# Patient Record
Sex: FEMALE | Race: WHITE | Employment: STUDENT | URBAN - METROPOLITAN AREA
[De-identification: names, ages, dates, MRNs, and addresses within clinical notes are randomized per-mention and may not be internally consistent; named-entity substitution may affect disease eponyms.]

---

## 2021-09-03 ENCOUNTER — OFFICE VISIT (OUTPATIENT)
Dept: NEUROLOGY | Age: 26
End: 2021-09-03
Payer: COMMERCIAL

## 2021-09-03 ENCOUNTER — DOCUMENTATION ONLY (OUTPATIENT)
Dept: NEUROLOGY | Age: 26
End: 2021-09-03

## 2021-09-03 VITALS
BODY MASS INDEX: 29.12 KG/M2 | SYSTOLIC BLOOD PRESSURE: 102 MMHG | WEIGHT: 181.2 LBS | RESPIRATION RATE: 16 BRPM | OXYGEN SATURATION: 98 % | HEIGHT: 66 IN | HEART RATE: 66 BPM | DIASTOLIC BLOOD PRESSURE: 80 MMHG

## 2021-09-03 DIAGNOSIS — F90.9 ATTENTION DEFICIT HYPERACTIVITY DISORDER (ADHD), UNSPECIFIED ADHD TYPE: ICD-10-CM

## 2021-09-03 DIAGNOSIS — G43.709 CHRONIC MIGRAINE W/O AURA, NOT INTRACTABLE, W/O STAT MIGR: Primary | ICD-10-CM

## 2021-09-03 PROCEDURE — 99204 OFFICE O/P NEW MOD 45 MIN: CPT | Performed by: PSYCHIATRY & NEUROLOGY

## 2021-09-03 RX ORDER — PROPRANOLOL HYDROCHLORIDE 120 MG/1
120 CAPSULE, EXTENDED RELEASE ORAL DAILY
COMMUNITY
End: 2021-09-03 | Stop reason: ALTCHOICE

## 2021-09-03 RX ORDER — TOPIRAMATE 50 MG/1
TABLET, FILM COATED ORAL 2 TIMES DAILY
COMMUNITY
End: 2021-10-15 | Stop reason: SDUPTHER

## 2021-09-03 RX ORDER — PROPRANOLOL HYDROCHLORIDE 60 MG/1
60 CAPSULE, EXTENDED RELEASE ORAL DAILY
Qty: 30 CAPSULE | Refills: 1 | Status: SHIPPED | OUTPATIENT
Start: 2021-09-03 | End: 2021-10-15 | Stop reason: SDUPTHER

## 2021-09-03 NOTE — PATIENT INSTRUCTIONS
[facility]   Statement to Patients  April 1, 2014      In an effort to ensure the large volume of patient prescription refills is processed in the most efficient and expeditious manner, we are asking our patients to assist us by calling your Pharmacy for all prescription refills, this will include also your  Mail Order Pharmacy. The pharmacy will contact our office electronically to continue the refill process. Please do not wait until the last minute to call your pharmacy. We need at least 48 hours (2days) to fill prescriptions. We also encourage you to call your pharmacy before going to  your prescription to make sure it is ready. With regard to controlled substance prescription refill requests (narcotic refills) that need to be picked up at our office, we ask your cooperation by providing us with at least 72 hours (3days) notice that you will need a refill. We will not refill narcotic prescription refill requests after 4:00pm on any weekday, Monday through Thursday, or after 2:00pm on Fridays, or on the weekends. We encourage everyone to explore another way of getting your prescription refill request processed using [employer], our patient web portal through our electronic medical record system. [employer] is an efficient and effective way to communicate your medication request directly to the office and  downloadable as an daljit on your smart phone . [employer] also features a review functionality that allows you to view your medication list as well as leave messages for your physician. Are you ready to get connected? If so please review the attatched instructions or speak to any of our staff to get you set up right away! Thank you so much for your cooperation. Should you have any questions please contact our Practice Administrator.     The Physicians and Staff,  [facility]

## 2021-09-03 NOTE — PROGRESS NOTES
Ms. Gregoria Granger presents as a new patient for evaluation of migraines. She reported getting a migraine approximately every two weeks. Depression screening done on this patient.

## 2021-09-03 NOTE — PROGRESS NOTES
Chief Complaint   Patient presents with    Neurologic Problem         HISTORY OF PRESENT ILLNESS  Armand Richardson is a 31 y.o. female came in to establish care regarding chronic migraines. She recently relocated from [facility] and used to follow-up with a neurologist there. She has had migraines for as long as she can remember. She describes her headaches typically as sharp throbbing pain, usually on the left side, associated with photophobia, phonophobia and occasionally nausea. Activity makes it worse. No other complicated features or neurological symptoms. Uses an NSAID as needed or takes nap/warm shower which helps. Has never been on any triptans for abortive. Was initially put on propanolol for prophylaxis which did not make much difference. Then topiramate was added about a year ago and it helped a lot. She now rarely gets a migraine and is taking 100 mg twice daily. Does experience paresthesias in her hands, a taste of carbonated beverage in her mouth and has lost weight but has acclimatized well to this medication. She was diagnosed with attention deficit hyperactivity disorder when she was in elementary school. She took medication for some time and then did fine without it. Recently she has been noticing that she cannot finish the tasks or jobs that she starts and struggles with things, and jumps from one thing to another. This is affecting her everyday performance at home and work. She is wondering if she needs some medication to help her focus better. 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     propranolol LA (INDERAL LA) 60 mg SR capsule Take 1 Capsule by mouth daily.  meclizine 25 mg Cap Take 25 mg by mouth daily. (Patient not taking: Reported on 9/3/2021)     No current facility-administered medications for this visit. No Known Allergies  History reviewed. No pertinent family history. Social History     Tobacco Use    Smoking status: Never Smoker    Smokeless tobacco: Never Used   Substance Use Topics    Alcohol use: No    Drug use: No     Past Surgical History:   Procedure Laterality Date    HX OTHER SURGICAL      removal of oral cyst         REVIEW OF SYSTEMS  Review of Systems - History obtained from the patient  Psychological ROS: negative  ENT ROS: negative  Hematological and Lymphatic ROS: negative  Endocrine ROS: negative  Respiratory ROS: no cough, shortness of breath, or wheezing  Cardiovascular ROS: no chest pain or dyspnea on exertion  Gastrointestinal ROS: no abdominal pain, change in bowel habits, or black or bloody stools  Genito-Urinary ROS: no dysuria, trouble voiding, or hematuria  Musculoskeletal ROS: negative  Dermatological ROS: negative      PHYSICAL EXAMINATION:    Visit Vitals  /80   Pulse 66   Resp 16   Ht 5' 6\" (1.676 m)   Wt 181 lb 3.2 oz (82.2 kg)   SpO2 98%   BMI 29.25 kg/m²     General:  Well groomed individual in no acute distress. Neck: Supple, nontender, no bruits, no pain with resistance to active range of motion. Heart: Regular rate and rhythm. Normal S1S2. Lungs:  Equal chest expansion, no cough, no wheeze  Musculoskeletal:  Extremities revealed no edema and had full range of motion of joints. Psych:  Good mood and bright affect    NEUROLOGICAL EXAMINATION:     Mental Status:   Alert and oriented to person, place, and time with recent and remote memory intact. Attention span and concentration are normal. Speech is fluent. Cranial Nerves:    II, III, IV, VI:  Visual acuity grossly intact. Visual fields are normal.    Pupils are equal, round, and reactive to light.     Extra-ocular movements are full and fluid. Fundoscopic exam was benign, no ptosis or nystagmus. V-XII: Hearing is grossly intact. Facial features are symmetric, with normal sensation and strength. The palate rises symmetrically and the tongue protrudes midline. Sternocleidomastoids 5/5. Motor Examination: Normal tone, bulk, and strength. 5/5 muscle strength throughout. No cogwheel rigidity or clonus present. Sensory exam:  Normal throughout to pinprick, temperature, and vibration sense. Normal proprioception. Coordination:  Finger to nose and rapid arm movement testing was normal.   No resting or intention tremor    Gait and Station:  Steady while walking on toes, heels, and with tandem walking. Normal arm swing. No Rhomberg or pronator drift. No muscle wasting or fasiculations noted. Reflexes:  DTRs 2+ throughout. Toes downgoing. ASSESSMENT    ICD-10-CM ICD-9-CM    1. Chronic migraine w/o aura, not intractable, w/o stat migr  G43.709 346.70 propranolol LA (INDERAL LA) 60 mg SR capsule   2. Attention deficit hyperactivity disorder (ADHD), unspecified ADHD type  F90.9 314.01 REFERRAL TO NEUROPSYCHOLOGY       DISCUSSION  Ms. Kathia Rios has chronic migraines without aura. Migraine treatment strategies were again reviewed including potential dietary and environmental triggers  Continue to use NSAIDs as needed for abortive therapy. She now rarely gets a migraine. Currently on 2 prophylactics including propanolol and topiramate. She reports that propanolol did not make much difference when she started it but topiramate helped her dramatically. We will try to lower the dose of propanolol to 60 mg extended release daily. If the migraine frequency does not change, will try to wean it off. Continue topiramate. She is tolerating it well with side effect profile including pregnancy category D.     She reports a history of attention deficit hyperactivity disorder and she is again having difficulties concentrating and focusing, staying on task and completing those on time.   This is impacting her life at work and school   I have recommended comprehensive neuropsychological assessment for further diagnostic clarification prior to considering any stimulant medications       Marry Ferrari MD  Diplomate, American Board of Psychiatry & Neurology (Neurology)  Diplomate, American Board of Psychiatry & Neurology (Clinical Neurophysiology)  Diplomate, American Board of Electrodiagnostic Medicine

## 2021-10-15 DIAGNOSIS — G43.709 CHRONIC MIGRAINE W/O AURA, NOT INTRACTABLE, W/O STAT MIGR: ICD-10-CM

## 2021-10-15 RX ORDER — TOPIRAMATE 50 MG/1
50 TABLET, FILM COATED ORAL 2 TIMES DAILY
Qty: 60 TABLET | Refills: 3 | Status: SHIPPED | OUTPATIENT
Start: 2021-10-15 | End: 2022-02-07

## 2021-10-15 RX ORDER — PROPRANOLOL HYDROCHLORIDE 60 MG/1
60 CAPSULE, EXTENDED RELEASE ORAL DAILY
Qty: 30 CAPSULE | Refills: 1 | Status: SHIPPED | OUTPATIENT
Start: 2021-10-15 | End: 2021-11-29

## 2021-10-15 NOTE — TELEPHONE ENCOUNTER
----- Message from April Claritza sent at 10/15/2021  3:32 PM EDT -----  Regarding: /Telephone  General Message/Vendor Calls      Caller's first and last name: Chelsea Keller       Reason for call:Pt Needs a refill request for \"  propenoal \"  and \"  tropamight \"        Callback required yes/no and why: yes      Best contact number(s): [phone]      Details to clarify the request:      April Claritza

## 2022-02-07 RX ORDER — TOPIRAMATE 50 MG/1
TABLET, FILM COATED ORAL
Qty: 60 TABLET | Refills: 2 | Status: SHIPPED | OUTPATIENT
Start: 2022-02-07 | End: 2022-05-31

## 2022-03-21 DIAGNOSIS — G43.709 CHRONIC MIGRAINE W/O AURA, NOT INTRACTABLE, W/O STAT MIGR: ICD-10-CM

## 2022-03-21 RX ORDER — PROPRANOLOL HYDROCHLORIDE 60 MG/1
60 CAPSULE, EXTENDED RELEASE ORAL DAILY
Qty: 90 CAPSULE | Refills: 2 | Status: SHIPPED | OUTPATIENT
Start: 2022-03-21 | End: 2022-07-11

## 2022-05-31 RX ORDER — TOPIRAMATE 50 MG/1
TABLET, FILM COATED ORAL
Qty: 60 TABLET | Refills: 2 | Status: SHIPPED | OUTPATIENT
Start: 2022-05-31 | End: 2022-07-15

## 2022-07-11 ENCOUNTER — NURSE TRIAGE (OUTPATIENT)
Dept: OTHER | Facility: CLINIC | Age: 27
End: 2022-07-11

## 2022-07-11 ENCOUNTER — OFFICE VISIT (OUTPATIENT)
Dept: FAMILY MEDICINE CLINIC | Age: 27
End: 2022-07-11
Payer: COMMERCIAL

## 2022-07-11 VITALS
SYSTOLIC BLOOD PRESSURE: 115 MMHG | DIASTOLIC BLOOD PRESSURE: 76 MMHG | HEART RATE: 72 BPM | WEIGHT: 203 LBS | OXYGEN SATURATION: 97 % | BODY MASS INDEX: 32.77 KG/M2

## 2022-07-11 DIAGNOSIS — R42 DIZZINESS: Primary | ICD-10-CM

## 2022-07-11 PROBLEM — N63.20 LEFT BREAST MASS: Status: ACTIVE | Noted: 2017-11-20

## 2022-07-11 PROCEDURE — 99204 OFFICE O/P NEW MOD 45 MIN: CPT | Performed by: STUDENT IN AN ORGANIZED HEALTH CARE EDUCATION/TRAINING PROGRAM

## 2022-07-11 RX ORDER — IBUPROFEN 800 MG/1
800 TABLET ORAL
COMMUNITY

## 2022-07-11 RX ORDER — MEDROXYPROGESTERONE ACETATE 150 MG/ML
150 INJECTION, SUSPENSION INTRAMUSCULAR
COMMUNITY

## 2022-07-11 RX ORDER — ESCITALOPRAM OXALATE 10 MG/1
10 TABLET ORAL DAILY
COMMUNITY
Start: 2021-10-29

## 2022-07-11 RX ORDER — PROPRANOLOL HYDROCHLORIDE 120 MG/1
120 CAPSULE, EXTENDED RELEASE ORAL DAILY
COMMUNITY

## 2022-07-11 NOTE — TELEPHONE ENCOUNTER
Received call from Danelle Heard at [facility] with [employer] Complaint. Subjective: Caller states \"Seen in [facility] over the weekend. Did find some abnormal findings on her EKG. \"     BP on the weekend 140/90 and 143/94 and 156/95 and 179/113    Current Symptoms: heart racing (Thursday, apple watch tracks heart rate and when standing it goes over 100. With any movement it was over 120. When lifting boxes it went over 180). Onset: 7 days    Associated Symptoms: dizziness    Pain Severity: denies pain    Temperature: denies fever    What has been tried: NA    LMP: NA Pregnant: NA    Recommended disposition: See in Office Today    Care advice provided, patient verbalizes understanding; denies any other questions or concerns; instructed to call back for any new or worsening symptoms. Patient agrees to be seen by PCP today. As a new patient if nothing is available, she is advised to report to [facility] for symptoms. Message sent to Anastasiya for scheduling of appointment. Attention Provider: Thank you for allowing me to participate in the care of your patient. The patient was connected to triage in response to information provided to the [facility]. Please do not respond through this encounter as the response is not directed to a shared pool.     Reason for Disposition   Heart beating very rapidly (e.g., > 140 / minute) and not present now (Exception: during exercise)    Protocols used: HEART RATE AND HEARTBEAT QUESTIONS-ADULT-[place]

## 2022-07-11 NOTE — PROGRESS NOTES
Subjective   Katia Medina is a 31 y.o. female who presents for Follow-up (shortness of breath,dizziness)    Dizziness  About 1 week ago, started feeling dizzy. Lasted for about 3 days, worse in evening. Went away with laying down. Also fatigued. BP at home was 179/113 at highest. Denise Lights to urgent care, who did labs and an EKG. HR to 185 on Apple Watch, usually with walking. Not room spinning dizziness. SOB when HR high. She is not currently sexually active. MI in Dad 2 years ago. No Fam Hx of CVA. Review of Systems   Review of Systems   Constitutional: Negative for chills and fever. HENT: Negative for congestion and sore throat. Eyes: Negative for discharge and redness. Respiratory: Negative for cough and shortness of breath. Cardiovascular: Negative for chest pain and palpitations. Gastrointestinal: Negative for nausea and vomiting. Genitourinary: Negative for difficulty urinating and dysuria. Musculoskeletal: Negative for gait problem and neck stiffness. Neurological: Positive for dizziness and light-headedness. Negative for tremors, seizures, syncope, facial asymmetry, speech difficulty, weakness, numbness and headaches. Medical History  Past Medical History:   Diagnosis Date    Bell's palsy     Migraine     Neurological disorder        Medications  Current Outpatient Medications   Medication Sig    escitalopram oxalate (LEXAPRO) 10 mg tablet Take 10 mg by mouth daily.  topiramate (TOPAMAX) 50 mg tablet Take 1 tablet by mouth twice daily.  propranolol LA (INDERAL LA) 120 mg SR capsule Take 120 mg by mouth daily.  medroxyPROGESTERone (DEPO-PROVERA) 150 mg/mL syrg 150 mg by IntraMUSCular route.  ibuprofen (MOTRIN) 800 mg tablet Take 800 mg by mouth every six (6) hours as needed. No current facility-administered medications for this visit. Immunizations     There is no immunization history on file for this patient.     Allergies   No Known Allergies    Objective   Vital Signs  Visit Vitals  /76 (BP 1 Location: Left arm, BP Patient Position: Sitting, BP Cuff Size: Adult long)   Pulse 72   Wt 203 lb (92.1 kg)   SpO2 97%   BMI 32.77 kg/m²       Physical Examination  Physical Exam  Constitutional:       General: She is not in acute distress. Appearance: Normal appearance. HENT:      Head: Normocephalic and atraumatic. Right Ear: External ear normal.      Left Ear: External ear normal.      Mouth/Throat:      Mouth: Mucous membranes are moist.      Pharynx: Oropharynx is clear. No oropharyngeal exudate or posterior oropharyngeal erythema. Eyes:      Extraocular Movements: Extraocular movements intact. Conjunctiva/sclera: Conjunctivae normal.      Pupils: Pupils are equal, round, and reactive to light. Cardiovascular:      Rate and Rhythm: Normal rate and regular rhythm. Pulses: Normal pulses. Heart sounds: Normal heart sounds. No murmur heard. Pulmonary:      Effort: Pulmonary effort is normal. No respiratory distress. Breath sounds: Normal breath sounds. Abdominal:      General: There is no distension. Palpations: Abdomen is soft. Tenderness: There is no abdominal tenderness. Musculoskeletal:      Right lower leg: No edema. Left lower leg: No edema. Skin:     Coloration: Skin is not jaundiced or pale. Findings: No bruising or rash. Neurological:      General: No focal deficit present. Mental Status: She is alert and oriented to person, place, and time. Cranial Nerves: No cranial nerve deficit. Sensory: No sensory deficit. Motor: No weakness. Coordination: Coordination normal.      Gait: Gait normal.   Psychiatric:         Mood and Affect: Mood normal.         Behavior: Behavior normal.          Assessment and Plan   Asim Mcmahon is a 31 y.o. female who presents for Follow-up (shortness of breath,dizziness)    1.  Dizziness  - Reviewed lab work from urgent care. UA, CBC, BMP wnl.  - Differential for dizziness is broad at this stage. Orthostatic vitals normal today. Propranolol is an unlikely culprit given duration she has been taking this med at this dose. With normal EKG at urgent care, no chest pain, cardiac concern is lower. Normal neuro exam, so posterior stroke is unlikely. She should keep a symptom diary and follow up with her neurologist for further eval, which may include adjustment of her propranolol for migraine ppx. She should return for annual physical. Gets paps with her OBGYN. ICD-10-CM ICD-9-CM    1. Dizziness  R42 780.4 CANCELED: AMB POC EKG ROUTINE W/ 12 LEADS, INTER & REP       Follow-up and Dispositions    · Return in about 6 weeks (around 8/22/2022) for Annual Physical.       Pt was discussed with Dr. Sumeet Lu (attending physician). I have reviewed patient medical and social history and medications. I have reviewed pertinent labs results and other data. I have discussed the diagnosis with the patient and the intended plan as seen in the above orders. The patient has received an after-visit summary and questions were answered concerning future plans. I have discussed medication side effects and warnings with the patient as well.     Kinjal Miles MD  Resident, [address] [place] [address]  07/11/22

## 2022-07-11 NOTE — PATIENT INSTRUCTIONS
Dizziness: Care Instructions  Your Care Instructions  Dizziness is the feeling of unsteadiness or fuzziness in your head. It is different than having vertigo, which is a feeling that the room is spinning or that you are moving or falling. It is also different from lightheadedness, which is the feeling that you are about to faint. It can be hard to know what causes dizziness. Some people feel dizzy when they have migraine headaches. Sometimes bouts of flu can make you feel dizzy. Some medical conditions, such as heart problems or high blood pressure, can make you feel dizzy. Many medicines can cause dizziness, including medicines for high blood pressure, pain, or anxiety. If a medicine causes your symptoms, your doctor may recommend that you stop or change the medicine. If it is a problem with your heart, you may need medicine to help your heart work better. If there is no clear reason for your symptoms, your doctor may suggest watching and waiting for a while to see if the dizziness goes away on its own. Follow-up care is a key part of your treatment and safety. Be sure to make and go to all appointments, and call your doctor if you are having problems. It's also a good idea to know your test results and keep a list of the medicines you take. How can you care for yourself at home? · If your doctor recommends or prescribes medicine, take it exactly as directed. Call your doctor if you think you are having a problem with your medicine. · Do not drive while you feel dizzy. · Try to prevent falls. Steps you can take include:  ? Using nonskid mats, adding grab bars near the tub, and using night-lights. ? Clearing your home so that walkways are free of anything you might trip on.  ? Letting family and friends know that you have been feeling dizzy. This will help them know how to help you. When should you call for help? Call 911 anytime you think you may need emergency care.  For example, call if:    · You passed out (lost consciousness).     · You have dizziness along with symptoms of a heart attack. These may include:  ? Chest pain or pressure, or a strange feeling in the chest.  ? Sweating. ? Shortness of breath. ? Nausea or vomiting. ? Pain, pressure, or a strange feeling in the back, neck, jaw, or upper belly or in one or both shoulders or arms. ? Lightheadedness or sudden weakness. ? A fast or irregular heartbeat.     · You have symptoms of a stroke. These may include:  ? Sudden numbness, tingling, weakness, or loss of movement in your face, arm, or leg, especially on only one side of your body. ? Sudden vision changes. ? Sudden trouble speaking. ? Sudden confusion or trouble understanding simple statements. ? Sudden problems with walking or balance. ? A sudden, severe headache that is different from past headaches. Call your doctor now or seek immediate medical care if:    · You feel dizzy and have a fever, headache, or ringing in your ears.     · You have new or increased nausea and vomiting.     · Your dizziness does not go away or comes back. Watch closely for changes in your health, and be sure to contact your doctor if:    · You do not get better as expected. Where can you learn more? Go to http://www.gray.com/  Enter Q823 in the search box to learn more about \"Dizziness: Care Instructions. \"  Current as of: July 1, 2021               Content Version: 13.2  © 9200-5302 [employer]. Care instructions adapted under license by [employer] (which disclaims liability or warranty for this information). If you have questions about a medical condition or this instruction, always ask your healthcare professional. [place] [address] any warranty or liability for your use of this information.

## 2022-07-15 ENCOUNTER — OFFICE VISIT (OUTPATIENT)
Dept: NEUROLOGY | Age: 27
End: 2022-07-15
Payer: COMMERCIAL

## 2022-07-15 VITALS
OXYGEN SATURATION: 97 % | DIASTOLIC BLOOD PRESSURE: 62 MMHG | BODY MASS INDEX: 32.47 KG/M2 | WEIGHT: 202 LBS | HEART RATE: 92 BPM | SYSTOLIC BLOOD PRESSURE: 100 MMHG | HEIGHT: 66 IN | RESPIRATION RATE: 16 BRPM

## 2022-07-15 DIAGNOSIS — F90.9 ATTENTION DEFICIT HYPERACTIVITY DISORDER (ADHD), UNSPECIFIED ADHD TYPE: ICD-10-CM

## 2022-07-15 DIAGNOSIS — G43.709 CHRONIC MIGRAINE W/O AURA, NOT INTRACTABLE, W/O STAT MIGR: Primary | ICD-10-CM

## 2022-07-15 PROCEDURE — 99214 OFFICE O/P EST MOD 30 MIN: CPT | Performed by: PSYCHIATRY & NEUROLOGY

## 2022-07-15 RX ORDER — TOPIRAMATE 50 MG/1
50 CAPSULE, EXTENDED RELEASE ORAL DAILY
Qty: 30 CAPSULE | Refills: 1 | Status: SHIPPED | OUTPATIENT
Start: 2022-07-15 | End: 2022-08-22

## 2022-07-15 NOTE — PROGRESS NOTES
Chief Complaint   Patient presents with    Follow-up     Dizziness: Patient reports getting better. HISTORY OF PRESENT ILLNESS  Carlotta Jiménez came in for follow-up. She was last seen almost 10 months ago. Overall, she is doing quite well and now may get a migraine 1 to 2 days out of the month. Uses ibuprofen which usually works for  [employer] to take propanolol extended release 60 mg daily along with topiramate 50 mg twice daily. She does report feeling very fatigued during the day and feels that she has to take a nap. Occasionally she will feel some tingling sensation in her fingertips and mouth. She has noticed that if she misses the dose of propanolol she gets a migraine but not so much association with missed dose of topiramate. She had lost some weight but recently put it back on. She is planning to go to grad school in the near future and then eventually wants to adopt kids. About a week ago she had an episode of dizziness and it would exacerbate if she would get up too quickly or move. Also felt some palpitations with it. Symptoms subsided/resolved on their own after about 3 days. Cardiac work-up/EKG was negative. Did not have any focal motor or sensory deficits, changes in vision, speech or coordination. RECAP  She recently relocated from [facility] and used to follow-up with a neurologist there. She has had migraines for as long as she can remember. She describes her headaches typically as sharp throbbing pain, usually on the left side, associated with photophobia, phonophobia and occasionally nausea. Activity makes it worse. No other complicated features or neurological symptoms. Uses an NSAID as needed or takes nap/warm shower which helps. Has never been on any triptans for abortive. Was initially put on propanolol for prophylaxis which did not make much difference. Then topiramate was added about a year ago and it helped a lot.   Does experience paresthesias in her hands, a taste of carbonated beverage in her mouth and has lost weight but has acclimatized well to this medication. She was diagnosed with attention deficit hyperactivity disorder when she was in elementary school. She took medication for some time and then did fine without it. Recently she has been noticing that she cannot finish the tasks or jobs that she starts and struggles with things, and jumps from one thing to another. This is affecting her everyday performance at home and work. She is wondering if she needs some medication to help her focus better. 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  escitalopram oxalate (LEXAPRO) 10 mg tablet Take 10 mg by mouth daily.  propranolol LA (INDERAL LA) 120 mg SR capsule Take 120 mg by mouth daily.  medroxyPROGESTERone (DEPO-PROVERA) 150 mg/mL syrg 150 mg by IntraMUSCular route.  ibuprofen (MOTRIN) 800 mg tablet Take 800 mg by mouth every six (6) hours as needed. No current facility-administered medications for this visit. PHYSICAL EXAMINATION:    Visit Vitals  /62 (BP 1 Location: Left upper arm, BP Patient Position: Sitting)   Pulse 92   Resp 16   Ht 5' 6\" (1.676 m)   Wt 202 lb (91.6 kg)   SpO2 97%   BMI 32.60 kg/m²       NEUROLOGICAL EXAMINATION:     Mental Status:   Alert and oriented to person, place, and time. Attention span and concentration are normal. Speech is fluent . Cranial Nerves:    II, III, IV, VI:  Visual acuity grossly intact. Visual fields are normal.    Pupils are equal, round, and reactive. Extra-ocular movements are full and fluid. V-XII: Hearing is grossly intact. Facial features are symmetric, with normal sensation and strength.   The palate rises symmetrically and the tongue protrudes midline. Motor Examination: Normal tone, bulk, and strength. Sensory exam:  Normal throughout     Coordination:  Finger to nose and rapid arm movement testing was normal.   No resting or intention tremor    Gait and Station:  Steady. Normal arm swing. No muscle wasting or fasiculations noted. ASSESSMENT    ICD-10-CM ICD-9-CM    1. Chronic migraine w/o aura, not intractable, w/o stat migr  G43.709 346.70 Trokendi XR 50 mg capsule   2. Attention deficit hyperactivity disorder (ADHD), unspecified ADHD type  F90.9 314.01 Trokendi XR 50 mg capsule       DISCUSSION  Ms. Jacquelyn Saldana has chronic migraines without aura. She is doing well with the current prophylactics and migraines are now rare, maybe once or 2 times per month. Uses NSAID's as an abortive and is currently on propanolol extended release 60 mg daily along with topiramate 50 mg twice daily  She reports that if she misses a dose of propanolol, migraine tends to come back but missing a dose of topiramate does not impact her much  She does report paresthesias around her mouth and fingertips and altered taste off and on which are likely due to topiramate. Recently had an episode where she felt dizzy, had some palpitations with it. She discussed this with Dr. Pablo Yoo and had basic cardiac work-up which was negative  Did not have any focal neurological symptoms and her exam is unremarkable  Etiology of this is unclear, peripheral/vestibular dysfunction remains a possibility  I will try to reduce her topiramate and change it to extended release Trokendi 50 mg at bedtime to minimize side effects  If symptoms return, we will need to consider additional work-up but my suspicion for posterior circulation ischemia in this young individual is low. May need extended cardiac monitoring.     She also has ADHD by history and we may need objective evidence with the help of a comprehensive neuropsychological assessment in case we plan on starting a stimulant      Katherin Bailey MD  Diplomate, American Board of Psychiatry & Neurology (Neurology)  Vashti Morris Board of Psychiatry & Neurology (Clinical Neurophysiology)  Diplomate, American Board of Electrodiagnostic Medicine

## 2022-07-15 NOTE — PROGRESS NOTES
TROKENDI XR 50MG given to patient-7 pills in total.    Patient instructions given to patient, patient understood.      LOT: [id number]  EXP: 1/31/2023  MANU: Paulie Hickman

## 2022-07-15 NOTE — PROGRESS NOTES
Chief Complaint   Patient presents with    Follow-up     Dizziness: Patient reports getting better.       Visit Vitals  /62 (BP 1 Location: Left upper arm, BP Patient Position: Sitting)   Pulse 92   Resp 16   Ht 5' 6\" (1.676 m)   Wt 202 lb (91.6 kg)   SpO2 97%   BMI 32.60 kg/m²

## 2022-07-18 ENCOUNTER — TELEPHONE (OUTPATIENT)
Dept: FAMILY MEDICINE CLINIC | Age: 27
End: 2022-07-18

## 2022-07-18 NOTE — TELEPHONE ENCOUNTER
----- Message from Daniel Rubiatrent sent at 7/15/2022 10:12 AM EDT -----  Subject: Message to Provider    QUESTIONS  Information for Provider? please call patient needs a doctor's note for   visit on 7/14/22. Please email doctor's note to yesy Waller?   [email or web address]  ---------------------------------------------------------------------------  --------------  Willy Maddox [facility]  [phone]; OK to leave message on voicemail  ---------------------------------------------------------------------------  --------------  SCRIPT ANSWERS  Relationship to Patient?  Self

## 2022-07-18 NOTE — LETTER
7/18/2022 1:43 PM    Ms. Royce Shanks  [address] [place] [address]      To whom it may concern:    Mike Dwyer was seen by me in my office on 7/11/2022. Please excuse her absence from any activities that day. Please let us know if you have any questions.       Sincerely,      Lindsay Ruiz MD

## 2022-08-22 ENCOUNTER — OFFICE VISIT (OUTPATIENT)
Dept: FAMILY MEDICINE CLINIC | Age: 27
End: 2022-08-22
Payer: COMMERCIAL

## 2022-08-22 VITALS
RESPIRATION RATE: 16 BRPM | HEART RATE: 91 BPM | OXYGEN SATURATION: 98 % | TEMPERATURE: 98.2 F | HEIGHT: 66 IN | BODY MASS INDEX: 34.23 KG/M2 | SYSTOLIC BLOOD PRESSURE: 108 MMHG | DIASTOLIC BLOOD PRESSURE: 71 MMHG | WEIGHT: 213 LBS

## 2022-08-22 DIAGNOSIS — E66.09 CLASS 1 OBESITY DUE TO EXCESS CALORIES WITHOUT SERIOUS COMORBIDITY WITH BODY MASS INDEX (BMI) OF 34.0 TO 34.9 IN ADULT: Primary | ICD-10-CM

## 2022-08-22 DIAGNOSIS — R41.840 ATTENTION DEFICIT: ICD-10-CM

## 2022-08-22 DIAGNOSIS — Z00.00 ANNUAL PHYSICAL EXAM: ICD-10-CM

## 2022-08-22 DIAGNOSIS — Z23 NEED FOR TETANUS BOOSTER: ICD-10-CM

## 2022-08-22 PROBLEM — E66.811 CLASS 1 OBESITY DUE TO EXCESS CALORIES WITHOUT SERIOUS COMORBIDITY WITH BODY MASS INDEX (BMI) OF 34.0 TO 34.9 IN ADULT: Status: ACTIVE | Noted: 2022-08-22

## 2022-08-22 PROCEDURE — 90715 TDAP VACCINE 7 YRS/> IM: CPT | Performed by: STUDENT IN AN ORGANIZED HEALTH CARE EDUCATION/TRAINING PROGRAM

## 2022-08-22 PROCEDURE — 99395 PREV VISIT EST AGE 18-39: CPT | Performed by: STUDENT IN AN ORGANIZED HEALTH CARE EDUCATION/TRAINING PROGRAM

## 2022-08-22 NOTE — PROGRESS NOTES
Subjective:   José Spain is an 31 y.o. female who presents for complete physical exam.    Doing well. No complaints. Diet: trying to eat more balanced, eating too many carbs  Exercise: active at work, no regular exercise  Tobacco use: no  Alcohol use: rarely    Health Maintenance   Topic Date Due    Hepatitis C Screening  Never done    COVID-19 Vaccine (1) Never done    Pap Smear  Earlier this summer with OBGYN    DTaP/Tdap/Td series (2 - Td or Tdap) 08/15/2016    Flu Vaccine (1) 09/01/2022    Depression Screen  07/15/2023    Pneumococcal 0-64 years  Aged Out     Attention Difficulty  Difficulty with inattention for many years. This occurs at home and at work. Becomes a problem at work to organize tasks. She is also starting grad school soon and would like to ensure she is able to focus well. Patient has not been treated for ADHD in the past.  Comorbid psychiatric hx: anxiety  Other meds: lexapro    Immunizations, reviewed:   Immunization History   Administered Date(s) Administered    HPV 08/21/2007, 07/17/2008    Hep B Vaccine 01/31/1995, 03/03/1995, 09/29/1995    TB Skin Test (PPD) Intradermal 04/23/2014    Tdap 08/15/2006, 08/22/2022     Health Maintenance, reviewed:  Diabetes: Due,   Lab Results   Component Value Date/Time    Hemoglobin A1c 5.0 08/22/2022 12:00 AM   Hepatitis C testing: Due  Lipids: Due,   Last Pap: UTD    Allergies, reviewed: Allergies   Allergen Reactions    Glycerin Rash    Isomalt Rash and Swelling    Buras Rash     RASH WITH TOUCH OF CORN: ABLE TO CONSUME  RASH WITH TOUCH OF CORN: ABLE TO CONSUME         Medications, reviewed:  Current Outpatient Medications   Medication Sig    escitalopram oxalate (LEXAPRO) 10 mg tablet Take 10 mg by mouth daily. propranolol LA (INDERAL LA) 120 mg SR capsule Take 120 mg by mouth daily. medroxyPROGESTERone (DEPO-PROVERA) 150 mg/mL syrg 150 mg by IntraMUSCular route.     ibuprofen (MOTRIN) 800 mg tablet Take 800 mg by mouth every six (6) hours as needed. Trokendi XR 50 mg capsule Take 1 capsule by mouth daily. No current facility-administered medications for this visit. Past Medical History,  reviewed:  Past Medical History:   Diagnosis Date    Bell's palsy     Migraine     Neurological disorder        Past Surgical History,  reviewed:  Past Surgical History:   Procedure Laterality Date    HX OTHER SURGICAL      removal of oral cyst       Family History,  reviewed:  Family History   Problem Relation Age of Onset    Hypertension Mother     Diabetes Mother     Obesity Mother     Thyroid Disease Mother     Heart Disease Father     Diabetes Father     Cancer Father     Heart Disease Maternal Grandmother     Diabetes Maternal Grandmother     Depression Maternal Grandmother     Heart Disease Maternal Grandfather        Social History, reviewed:  Social History     Socioeconomic History    Marital status: SINGLE     Spouse name: Not on file    Number of children: Not on file    Years of education: Not on file    Highest education level: Not on file   Occupational History    Not on file   Tobacco Use    Smoking status: Never    Smokeless tobacco: Never   Vaping Use    Vaping Use: Never used   Substance and Sexual Activity    Alcohol use: No    Drug use: No    Sexual activity: Not on file   Other Topics Concern    Not on file   Social History Narrative    Not on file     Social Determinants of Health     Financial Resource Strain: Not on file   Food Insecurity: Not on file   Transportation Needs: Not on file   Physical Activity: Not on file   Stress: Not on file   Social Connections: Not on file   Intimate Partner Violence: Not on file   Housing Stability: Not on file       Review of Systems:   Review of Systems   Psychiatric/Behavioral:  Positive for decreased concentration. Negative for agitation, dysphoric mood, self-injury and suicidal ideas.        Objective:   Visit Vitals  /71 (BP 1 Location: Right upper arm, BP Patient Position: Sitting)   Pulse 91   Temp 98.2 °F (36.8 °C) (Oral)   Resp 16   Ht 5' 6\" (1.676 m)   Wt 213 lb (96.6 kg)   SpO2 98%   BMI 34.38 kg/m²     Physical Exam  Constitutional:       General: She is not in acute distress. Appearance: Normal appearance. HENT:      Head: Normocephalic and atraumatic. Right Ear: External ear normal.      Left Ear: External ear normal.      Nose: Nose normal.   Eyes:      Extraocular Movements: Extraocular movements intact. Conjunctiva/sclera: Conjunctivae normal.   Cardiovascular:      Rate and Rhythm: Normal rate and regular rhythm. Pulses: Normal pulses. Heart sounds: Normal heart sounds. Pulmonary:      Effort: Pulmonary effort is normal.      Breath sounds: Normal breath sounds. Abdominal:      General: Abdomen is flat. Palpations: Abdomen is soft. Tenderness: There is no abdominal tenderness. Musculoskeletal:      Right lower leg: No edema. Left lower leg: No edema. Neurological:      General: No focal deficit present. Mental Status: She is alert and oriented to person, place, and time. Assessment and Plan   Jodi Milian is a 31 y.o. who presents for annual physical.    1. Class 1 obesity due to excess calories without serious comorbidity with body mass index (BMI) of 34.0 to 34.9 in adult  -     HEMOGLOBIN A1C WITH EAG; Future  -     METABOLIC PANEL, COMPREHENSIVE; Future  -     CBC W/O DIFF; Future  2. Attention deficit  Assessment & Plan:  Attention Difficulty  - TSH  - Referral to Neuropsych  Orders:  -     REFERRAL TO NEUROPSYCHOLOGY  -     TSH 3RD GENERATION; Future  3. Annual physical exam  4. Need for tetanus booster  -     TDAP, BOOSTRIX, (AGE 10 YRS+), IM     Return in about 1 year (around 8/22/2023) for Annual Physical.    I have discussed the diagnosis with the patient and the intended plan as seen in the above orders.  The patient has received an after-visit summary and questions were answered concerning future plans.  I have discussed medication side effects and warnings with the patient as well. Informed pt to return to the office if new symptoms arise.     Patient discussed with Dr. Johnie Patel (attending)     Blessing Tom MD  Resident, [address] [place] [address]  08/25/22

## 2022-08-22 NOTE — PROGRESS NOTES
Mark Hernandez is a 31 y.o. female    Chief Complaint   Patient presents with    Complete Physical     Patient is coming in for a complete physical. No other concerns. 1. Have you been to the ER, urgent care clinic since your last visit? Hospitalized since your last visit? No    2. Have you seen or consulted any other health care providers outside of the [address] [place] [address] since your last visit? Include any pap smears or colon screening. No      Visit Vitals  /71 (BP 1 Location: Right upper arm, BP Patient Position: Sitting)   Pulse 91   Temp 98.2 °F (36.8 °C) (Oral)   Resp 16   Ht 5' 6\" (1.676 m)   Wt 213 lb (96.6 kg)   SpO2 98%   BMI 34.38 kg/m²           Health Maintenance Due   Topic Date Due    Hepatitis C Screening  Never done    COVID-19 Vaccine (1) Never done    Pap Smear  Never done    DTaP/Tdap/Td series (2 - Td or Tdap) 08/15/2016         Medication Reconciliation completed, changes noted.   Please  Update medication list.

## 2022-08-23 LAB
ALBUMIN SERPL-MCNC: 4.8 G/DL (ref 3.9–5)
ALBUMIN/GLOB SERPL: 2.1 {RATIO} (ref 1.2–2.2)
ALP SERPL-CCNC: 86 IU/L (ref 44–121)
ALT SERPL-CCNC: 19 IU/L (ref 0–32)
AST SERPL-CCNC: 16 IU/L (ref 0–40)
BILIRUB SERPL-MCNC: 0.3 MG/DL (ref 0–1.2)
BUN SERPL-MCNC: 14 MG/DL (ref 6–20)
BUN/CREAT SERPL: 16 (ref 9–23)
CALCIUM SERPL-MCNC: 9.5 MG/DL (ref 8.7–10.2)
CHLORIDE SERPL-SCNC: 104 MMOL/L (ref 96–106)
CO2 SERPL-SCNC: 16 MMOL/L (ref 20–29)
CREAT SERPL-MCNC: 0.85 MG/DL (ref 0.57–1)
EGFR: 96 ML/MIN/1.73
ERYTHROCYTE [DISTWIDTH] IN BLOOD BY AUTOMATED COUNT: 12.1 % (ref 11.7–15.4)
EST. AVERAGE GLUCOSE BLD GHB EST-MCNC: 97 MG/DL
GLOBULIN SER CALC-MCNC: 2.3 G/DL (ref 1.5–4.5)
GLUCOSE SERPL-MCNC: 80 MG/DL (ref 65–99)
HBA1C MFR BLD: 5 % (ref 4.8–5.6)
HCT VFR BLD AUTO: 37.9 % (ref 34–46.6)
HGB BLD-MCNC: 12.8 G/DL (ref 11.1–15.9)
MCH RBC QN AUTO: 32.6 PG (ref 26.6–33)
MCHC RBC AUTO-ENTMCNC: 33.8 G/DL (ref 31.5–35.7)
MCV RBC AUTO: 96 FL (ref 79–97)
PLATELET # BLD AUTO: 246 X10E3/UL (ref 150–450)
POTASSIUM SERPL-SCNC: 3.9 MMOL/L (ref 3.5–5.2)
PROT SERPL-MCNC: 7.1 G/DL (ref 6–8.5)
RBC # BLD AUTO: 3.93 X10E6/UL (ref 3.77–5.28)
SODIUM SERPL-SCNC: 139 MMOL/L (ref 134–144)
TSH SERPL DL<=0.005 MIU/L-ACNC: 3.48 UIU/ML (ref 0.45–4.5)
WBC # BLD AUTO: 9.3 X10E3/UL (ref 3.4–10.8)

## 2022-11-21 DIAGNOSIS — F90.9 ATTENTION DEFICIT HYPERACTIVITY DISORDER (ADHD), UNSPECIFIED ADHD TYPE: ICD-10-CM

## 2022-11-21 DIAGNOSIS — G43.709 CHRONIC MIGRAINE W/O AURA, NOT INTRACTABLE, W/O STAT MIGR: ICD-10-CM

## 2022-11-21 RX ORDER — TOPIRAMATE 50 MG/1
50 CAPSULE, EXTENDED RELEASE ORAL DAILY
Qty: 30 CAPSULE | Refills: 3 | Status: SHIPPED | OUTPATIENT
Start: 2022-11-21

## 2022-11-21 NOTE — TELEPHONE ENCOUNTER
Received fax refill from [email or web address] requesting Trokendi XR 50MG. Last refill:8/22/22    Last visit:7/15/22    Next appointment:7/25/23    Please review and fill if warranted.

## 2022-11-28 ENCOUNTER — OFFICE VISIT (OUTPATIENT)
Dept: FAMILY MEDICINE CLINIC | Age: 27
End: 2022-11-28
Payer: COMMERCIAL

## 2022-11-28 VITALS
SYSTOLIC BLOOD PRESSURE: 107 MMHG | RESPIRATION RATE: 16 BRPM | HEIGHT: 66 IN | DIASTOLIC BLOOD PRESSURE: 72 MMHG | OXYGEN SATURATION: 97 % | BODY MASS INDEX: 36.16 KG/M2 | WEIGHT: 225 LBS | HEART RATE: 86 BPM

## 2022-11-28 DIAGNOSIS — Z23 ENCOUNTER FOR IMMUNIZATION: ICD-10-CM

## 2022-11-28 DIAGNOSIS — F41.9 ANXIETY: Primary | ICD-10-CM

## 2022-11-28 PROCEDURE — 99213 OFFICE O/P EST LOW 20 MIN: CPT | Performed by: STUDENT IN AN ORGANIZED HEALTH CARE EDUCATION/TRAINING PROGRAM

## 2022-11-28 PROCEDURE — 90686 IIV4 VACC NO PRSV 0.5 ML IM: CPT | Performed by: STUDENT IN AN ORGANIZED HEALTH CARE EDUCATION/TRAINING PROGRAM

## 2022-11-28 PROCEDURE — 90471 IMMUNIZATION ADMIN: CPT | Performed by: STUDENT IN AN ORGANIZED HEALTH CARE EDUCATION/TRAINING PROGRAM

## 2022-11-28 RX ORDER — ESCITALOPRAM OXALATE 10 MG/1
10 TABLET ORAL DAILY
Qty: 90 TABLET | Refills: 3 | Status: SHIPPED | OUTPATIENT
Start: 2022-11-28

## 2022-11-28 NOTE — LETTER
NOTIFICATION RETURN TO WORK / SCHOOL    11/28/2022 3:25 PM    Ms. Umu Menjivar  [address] [facility] [address]      To Whom It May Concern:    Umu Menjivar is currently under the care of [facility]. Please excuse her from any work or activities that were missed on 11/28/2022. If there are questions or concerns please have the patient contact our office.         Sincerely,      Josefina Bynum MD

## 2022-11-28 NOTE — LETTER
Name: Edilma Heading   Sex: female   : 1995   [address]  [facility]  [phone] (home)     Current Immunizations:  Immunization History   Administered Date(s) Administered    COVID-19, PFIZER PURPLE top, DILUTE for use, (age 15 y+), IM, 30mcg/0.3mL 03/10/2021, 2021, 10/09/2021    DTaP 1995, 1995, 1995, 1996, 1999    HPV 2007, 2008    Hep B Vaccine 1995, 1995, 1995    Hib 1995, 1995, 1995, 1996    MMR 1996, 2000    Meningococcal (MCV4P) Vaccine 2007, 2012    Poliovirus vaccine 1995, 1995, 1996, 1999    TB Skin Test (PPD) Intradermal 2014    Tdap 08/15/2006, 2022    Varicella Virus Vaccine 1996, 2008       Allergies:   Allergies as of 2022 - Fully Reviewed 2022   Allergen Reaction Noted    Glycerin Rash 2018    Isomalt Rash and Swelling 2018    Corn Rash 2017

## 2022-11-28 NOTE — PROGRESS NOTES
Subjective   Patrick Krishna is a 31 y.o. 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 Needs form filled out. Up to date on vaccines. Anxiety  Taking 10mg Lexapro daily for about 1 year. No panic attacks since starting. No SI/HI. Stbale on med. Wants to continue current dose. PHQ-9 score 8. URMILA-7 score 8. Review of Systems   Review of Systems   Constitutional:  Negative for chills and fever. Genitourinary:  Negative for menstrual problem and pelvic pain. Psychiatric/Behavioral:  Positive for decreased concentration. Negative for agitation, behavioral problems, confusion, dysphoric mood, hallucinations, self-injury, sleep disturbance and suicidal ideas. The patient is nervous/anxious. The patient is not hyperactive. Medical History  Past Medical History:   Diagnosis Date    Bell's palsy     Migraine     Neurological disorder        Medications  Current Outpatient Medications   Medication Sig    escitalopram oxalate (LEXAPRO) 10 mg tablet Take 1 Tablet by mouth daily. Trokendi XR 50 mg capsule Take 1 Capsule by mouth daily. propranolol LA (INDERAL LA) 120 mg SR capsule Take 120 mg by mouth daily. medroxyPROGESTERone (DEPO-PROVERA) 150 mg/mL syrg 150 mg by IntraMUSCular route. ibuprofen (MOTRIN) 800 mg tablet Take 800 mg by mouth every six (6) hours as needed. No current facility-administered medications for this visit.        Immunizations   Immunization History   Administered Date(s) Administered    HPV 08/21/2007, 07/17/2008    Hep B Vaccine 01/31/1995, 03/03/1995, 09/29/1995    TB Skin Test (PPD) Intradermal 04/23/2014    Tdap 08/15/2006, 08/22/2022       Allergies   Allergies   Allergen Reactions    Glycerin Rash    Isomalt Rash and Swelling    Douglas Rash     RASH WITH TOUCH OF CORN: ABLE TO CONSUME  RASH WITH TOUCH OF CORN: ABLE TO CONSUME         Objective   Vital Signs  Visit Vitals  /72 (BP 1 Location: Right arm, BP Patient Position: Sitting)   Pulse 86   Resp 16   Ht 5' 6\" (1.676 m)   Wt 225 lb (102.1 kg)   SpO2 97%   BMI 36.32 kg/m²       Physical Examination  Physical Exam  Psychiatric:         Attention and Perception: Attention and perception normal.         Mood and Affect: Mood and affect normal.         Speech: Speech normal.         Behavior: Behavior normal. Behavior is cooperative. Thought Content: Thought content normal.         Cognition and Memory: Cognition and memory normal.         Judgment: Judgment normal.        Assessment and Plan   Edilma Dan is a 31 y.o. female who presents for Medication Evaluation and Form Completion    1. Anxiety  Stable on Lexapro for a year now. Refill this medication. - escitalopram oxalate (LEXAPRO) 10 mg tablet; Take 1 Tablet by mouth daily. Dispense: 90 Tablet; Refill: 3    2. Encounter for immunization  - INFLUENZA, FLUARIX, FLULAVAL, FLUZONE (AGE 6 MO+), AFLURIA(AGE 3Y+) IM, PF, 0.5 ML     Return in about 9 months (around 8/28/2023) for Annual Physical.    Pt was discussed with Dr. Kevin Thompson (attending physician). I have reviewed patient medical and social history and medications. I have reviewed pertinent labs results and other data. I have discussed the diagnosis with the patient and the intended plan as seen in the above orders. The patient has received an after-visit summary and questions were answered concerning future plans. I have discussed medication side effects and warnings with the patient as well.     Willy Calvert MD  Resident, [address] [place] [address]  11/28/22

## 2022-12-01 RX ORDER — PROPRANOLOL HYDROCHLORIDE 60 MG/1
CAPSULE, EXTENDED RELEASE ORAL
Qty: 210 CAPSULE | Refills: 3 | Status: SHIPPED | OUTPATIENT
Start: 2022-12-01

## 2022-12-09 ENCOUNTER — TELEPHONE (OUTPATIENT)
Dept: FAMILY MEDICINE CLINIC | Age: 27
End: 2022-12-09

## 2022-12-09 NOTE — TELEPHONE ENCOUNTER
Trenton Foster,    Patient is going to school at [employer] they are requiring her to have a more detailed letter sent in stating that she can have her emotional support animal living with her in housing. She had a letter written by her therapist (she uploaded the letter in her chart) but she no longer sees that doctor. The school stated the letter needed to be more detailed. If you are able to do this please let me know so I can inform the patient.

## 2022-12-09 NOTE — LETTER
12/9/2022 4:52 PM    Ms. Nishi Cox  [address] [facility]    To whom it may concern:    Nishi Cox is under my care at [address] [place] [address]. I have evaluated Ms. Jiménez's mental health condition. She meets the definition of disability under the [facility]. Her mental health disorder, which is recognized in the DSM-5, substantially limits one or more major life activities or major bodily functions. To help with these limitations, I am recommending an emotional support animal (ELVIRA). This emotional support animal is necessary to provide therapeutic emotional support that alleviate symptoms of her disability, and to enhance her ability to function and live independently and fully use and enjoy the dwelling unit you own and/or administer. Please allow my patient to live with their ELVIRA as a reasonable accommodation under the [address]. Do not hesitate to call with any questions or clarifications regarding this matter.       Sincerely,      Shweta Carrillo MD

## 2022-12-14 ENCOUNTER — TELEPHONE (OUTPATIENT)
Dept: FAMILY MEDICINE CLINIC | Age: 27
End: 2022-12-14

## 2022-12-14 NOTE — TELEPHONE ENCOUNTER
Called patient to discuss emotional support animal letter. [employer] seems to want very specific information. Patient consents to release of diagnosis information verbally. She request that this is done so that she can live with her emotional support dog in student housing at [employer].

## 2023-01-11 ENCOUNTER — TELEPHONE (OUTPATIENT)
Dept: FAMILY MEDICINE CLINIC | Age: 28
End: 2023-01-11

## 2023-01-11 NOTE — TELEPHONE ENCOUNTER
Patient called wanting to increase the dosage of her Lexapro medication. She stated that at her previous doctor she was on 20mg. Would like to be contacted at your earliest convenience in regards to this. Thanks!

## 2023-01-13 ENCOUNTER — PATIENT MESSAGE (OUTPATIENT)
Dept: FAMILY MEDICINE CLINIC | Age: 28
End: 2023-01-13

## 2023-01-13 DIAGNOSIS — F41.9 ANXIETY: ICD-10-CM

## 2023-01-16 DIAGNOSIS — G43.709 CHRONIC MIGRAINE W/O AURA, NOT INTRACTABLE, W/O STAT MIGR: ICD-10-CM

## 2023-01-16 DIAGNOSIS — F90.9 ATTENTION DEFICIT HYPERACTIVITY DISORDER (ADHD), UNSPECIFIED ADHD TYPE: ICD-10-CM

## 2023-01-16 RX ORDER — TOPIRAMATE 50 MG/1
CAPSULE, EXTENDED RELEASE ORAL
Qty: 30 CAPSULE | Refills: 3 | Status: SHIPPED | OUTPATIENT
Start: 2023-01-16

## 2023-01-20 RX ORDER — ESCITALOPRAM OXALATE 20 MG/1
20 TABLET ORAL DAILY
Qty: 90 TABLET | Refills: 3 | Status: SHIPPED | OUTPATIENT
Start: 2023-01-20

## 2023-01-20 NOTE — TELEPHONE ENCOUNTER
Increase Lexapro dose that was recently taken over, as patient reported medication. She has been on this medication for over a year, stable on this dose, so we will continue at this time.

## 2023-04-23 DIAGNOSIS — G43.709 CHRONIC MIGRAINE W/O AURA, NOT INTRACTABLE, W/O STAT MIGR: ICD-10-CM

## 2023-04-23 DIAGNOSIS — F90.9 ATTENTION DEFICIT HYPERACTIVITY DISORDER (ADHD), UNSPECIFIED ADHD TYPE: ICD-10-CM

## 2023-04-24 RX ORDER — TOPIRAMATE 50 MG/1
CAPSULE, EXTENDED RELEASE ORAL
Qty: 30 CAPSULE | Refills: 3 | Status: SHIPPED | OUTPATIENT
Start: 2023-04-24

## 2023-05-25 RX ORDER — ESCITALOPRAM OXALATE 20 MG/1
20 TABLET ORAL DAILY
COMMUNITY
Start: 2023-01-20

## 2023-05-25 RX ORDER — TOPIRAMATE 50 MG/1
1 CAPSULE, EXTENDED RELEASE ORAL DAILY
COMMUNITY
Start: 2023-04-24

## 2023-05-25 RX ORDER — PROPRANOLOL HCL 60 MG
1 CAPSULE, EXTENDED RELEASE 24HR ORAL DAILY
COMMUNITY
Start: 2022-12-01

## 2023-05-25 RX ORDER — MEDROXYPROGESTERONE ACETATE 150 MG/ML
150 INJECTION, SUSPENSION INTRAMUSCULAR
COMMUNITY

## 2023-05-25 RX ORDER — IBUPROFEN 800 MG/1
800 TABLET ORAL EVERY 6 HOURS PRN
COMMUNITY

## 2023-08-29 ENCOUNTER — HOSPITAL ENCOUNTER (EMERGENCY)
Facility: HOSPITAL | Age: 28
Discharge: HOME | End: 2023-08-29
Attending: EMERGENCY MEDICINE
Payer: COMMERCIAL

## 2023-08-29 ENCOUNTER — APPOINTMENT (OUTPATIENT)
Facility: HOSPITAL | Age: 28
End: 2023-08-29
Payer: COMMERCIAL

## 2023-08-29 VITALS
DIASTOLIC BLOOD PRESSURE: 84 MMHG | HEART RATE: 74 BPM | SYSTOLIC BLOOD PRESSURE: 122 MMHG | OXYGEN SATURATION: 97 % | RESPIRATION RATE: 20 BRPM | TEMPERATURE: 98 F

## 2023-08-29 DIAGNOSIS — S29.011A MUSCLE STRAIN OF CHEST WALL, INITIAL ENCOUNTER: Primary | ICD-10-CM

## 2023-08-29 PROCEDURE — 96372 THER/PROPH/DIAG INJ SC/IM: CPT

## 2023-08-29 PROCEDURE — 6360000002 HC RX W HCPCS

## 2023-08-29 PROCEDURE — 99284 EMERGENCY DEPT VISIT MOD MDM: CPT

## 2023-08-29 PROCEDURE — 71046 X-RAY EXAM CHEST 2 VIEWS: CPT

## 2023-08-29 PROCEDURE — 6370000000 HC RX 637 (ALT 250 FOR IP)

## 2023-08-29 RX ORDER — KETOROLAC TROMETHAMINE 30 MG/ML
15 INJECTION, SOLUTION INTRAMUSCULAR; INTRAVENOUS
Status: COMPLETED | OUTPATIENT
Start: 2023-08-29 | End: 2023-08-29

## 2023-08-29 RX ORDER — METHOCARBAMOL 750 MG/1
750 TABLET, FILM COATED ORAL 4 TIMES DAILY
Qty: 16 TABLET | Refills: 0 | Status: SHIPPED | OUTPATIENT
Start: 2023-08-29 | End: 2023-09-02

## 2023-08-29 RX ORDER — METHOCARBAMOL 500 MG/1
750 TABLET, FILM COATED ORAL ONCE
Status: COMPLETED | OUTPATIENT
Start: 2023-08-29 | End: 2023-08-29

## 2023-08-29 RX ADMIN — METHOCARBAMOL TABLETS 750 MG: 500 TABLET, COATED ORAL at 16:14

## 2023-08-29 RX ADMIN — KETOROLAC TROMETHAMINE 15 MG: 30 INJECTION, SOLUTION INTRAMUSCULAR; INTRAVENOUS at 15:24

## 2023-08-29 ASSESSMENT — PAIN SCALES - GENERAL
PAINLEVEL_OUTOF10: 2
PAINLEVEL_OUTOF10: 6

## 2023-08-29 ASSESSMENT — PAIN DESCRIPTION - DESCRIPTORS
DESCRIPTORS: STABBING
DESCRIPTORS: ACHING

## 2023-08-29 ASSESSMENT — PAIN DESCRIPTION - PAIN TYPE: TYPE: ACUTE PAIN

## 2023-08-29 ASSESSMENT — PAIN - FUNCTIONAL ASSESSMENT: PAIN_FUNCTIONAL_ASSESSMENT: 0-10

## 2023-08-29 ASSESSMENT — PAIN DESCRIPTION - LOCATION
LOCATION: RIB CAGE
LOCATION: FLANK

## 2023-08-29 ASSESSMENT — PAIN DESCRIPTION - ORIENTATION
ORIENTATION: LEFT
ORIENTATION: LEFT

## 2023-08-29 NOTE — ED TRIAGE NOTES
Pt c/o LEFT rib pain x1 week. Pt reports she recently had Covid and may have pulled a muscle. Denies known injury.

## 2023-08-29 NOTE — DISCHARGE INSTRUCTIONS
Thank you for allowing us to provide you with medical care today. We realize that you have many choices for your emergency care needs. We thank you for choosing [facility]. Please choose us in the future for any continued health care needs. The exam and treatment you received in the Emergency Department were for an emergent problem and are not intended as complete care. It is important that you follow up with a doctor, nurse practitioner, or physician's assistant for ongoing care. If your symptoms worsen or you do not improve as expected and you are unable to reach your usual health care provider, you should return to the Emergency Department. We are available 24 hours a day. Please make an appointment with your health care provider(s) for follow up of your Emergency Department visit. Take this sheet with you when you go to your follow-up visit.

## 2023-08-31 LAB
EKG ATRIAL RATE: 73 BPM
EKG DIAGNOSIS: NORMAL
EKG P AXIS: 30 DEGREES
EKG P-R INTERVAL: 150 MS
EKG Q-T INTERVAL: 386 MS
EKG QRS DURATION: 82 MS
EKG QTC CALCULATION (BAZETT): 425 MS
EKG R AXIS: 18 DEGREES
EKG T AXIS: 3 DEGREES
EKG VENTRICULAR RATE: 73 BPM

## 2023-09-05 ENCOUNTER — OFFICE VISIT (OUTPATIENT)
Age: 28
End: 2023-09-05
Payer: COMMERCIAL

## 2023-09-05 DIAGNOSIS — Z11.4 SCREENING FOR HIV WITHOUT PRESENCE OF RISK FACTORS: ICD-10-CM

## 2023-09-05 DIAGNOSIS — Z13.220 SCREENING FOR LIPID DISORDERS: ICD-10-CM

## 2023-09-05 DIAGNOSIS — Z00.00 ANNUAL PHYSICAL EXAM: Primary | ICD-10-CM

## 2023-09-05 DIAGNOSIS — F41.9 ANXIETY DISORDER, UNSPECIFIED TYPE: ICD-10-CM

## 2023-09-05 DIAGNOSIS — Z11.59 NEED FOR HEPATITIS C SCREENING TEST: ICD-10-CM

## 2023-09-05 PROCEDURE — 99395 PREV VISIT EST AGE 18-39: CPT

## 2023-09-05 RX ORDER — ESCITALOPRAM OXALATE 20 MG/1
20 TABLET ORAL DAILY
Qty: 90 TABLET | Refills: 3 | Status: SHIPPED | OUTPATIENT
Start: 2023-09-05

## 2023-09-05 SDOH — ECONOMIC STABILITY: FOOD INSECURITY: WITHIN THE PAST 12 MONTHS, THE FOOD YOU BOUGHT JUST DIDN'T LAST AND YOU DIDN'T HAVE MONEY TO GET MORE.: NEVER TRUE

## 2023-09-05 SDOH — ECONOMIC STABILITY: FOOD INSECURITY: WITHIN THE PAST 12 MONTHS, YOU WORRIED THAT YOUR FOOD WOULD RUN OUT BEFORE YOU GOT MONEY TO BUY MORE.: NEVER TRUE

## 2023-09-05 SDOH — ECONOMIC STABILITY: INCOME INSECURITY: HOW HARD IS IT FOR YOU TO PAY FOR THE VERY BASICS LIKE FOOD, HOUSING, MEDICAL CARE, AND HEATING?: NOT HARD AT ALL

## 2023-09-05 SDOH — ECONOMIC STABILITY: HOUSING INSECURITY
IN THE LAST 12 MONTHS, WAS THERE A TIME WHEN YOU DID NOT HAVE A STEADY PLACE TO SLEEP OR SLEPT IN A SHELTER (INCLUDING NOW)?: NO

## 2023-09-05 ASSESSMENT — ANXIETY QUESTIONNAIRES
1. FEELING NERVOUS, ANXIOUS, OR ON EDGE: 0
5. BEING SO RESTLESS THAT IT IS HARD TO SIT STILL: 2
3. WORRYING TOO MUCH ABOUT DIFFERENT THINGS: 1
GAD7 TOTAL SCORE: 7
2. NOT BEING ABLE TO STOP OR CONTROL WORRYING: 0
7. FEELING AFRAID AS IF SOMETHING AWFUL MIGHT HAPPEN: 0
6. BECOMING EASILY ANNOYED OR IRRITABLE: 1
IF YOU CHECKED OFF ANY PROBLEMS ON THIS QUESTIONNAIRE, HOW DIFFICULT HAVE THESE PROBLEMS MADE IT FOR YOU TO DO YOUR WORK, TAKE CARE OF THINGS AT HOME, OR GET ALONG WITH OTHER PEOPLE: SOMEWHAT DIFFICULT
4. TROUBLE RELAXING: 3

## 2023-09-05 ASSESSMENT — PATIENT HEALTH QUESTIONNAIRE - PHQ9
2. FEELING DOWN, DEPRESSED OR HOPELESS: 0
SUM OF ALL RESPONSES TO PHQ QUESTIONS 1-9: 7
SUM OF ALL RESPONSES TO PHQ QUESTIONS 1-9: 7
9. THOUGHTS THAT YOU WOULD BE BETTER OFF DEAD, OR OF HURTING YOURSELF: 0
SUM OF ALL RESPONSES TO PHQ QUESTIONS 1-9: 7
10. IF YOU CHECKED OFF ANY PROBLEMS, HOW DIFFICULT HAVE THESE PROBLEMS MADE IT FOR YOU TO DO YOUR WORK, TAKE CARE OF THINGS AT HOME, OR GET ALONG WITH OTHER PEOPLE: 1
4. FEELING TIRED OR HAVING LITTLE ENERGY: 2
6. FEELING BAD ABOUT YOURSELF - OR THAT YOU ARE A FAILURE OR HAVE LET YOURSELF OR YOUR FAMILY DOWN: 0
5. POOR APPETITE OR OVEREATING: 0
7. TROUBLE CONCENTRATING ON THINGS, SUCH AS READING THE NEWSPAPER OR WATCHING TELEVISION: 3
SUM OF ALL RESPONSES TO PHQ QUESTIONS 1-9: 7
3. TROUBLE FALLING OR STAYING ASLEEP: 2
SUM OF ALL RESPONSES TO PHQ9 QUESTIONS 1 & 2: 0
8. MOVING OR SPEAKING SO SLOWLY THAT OTHER PEOPLE COULD HAVE NOTICED. OR THE OPPOSITE, BEING SO FIGETY OR RESTLESS THAT YOU HAVE BEEN MOVING AROUND A LOT MORE THAN USUAL: 0
1. LITTLE INTEREST OR PLEASURE IN DOING THINGS: 0

## 2023-09-05 NOTE — PROGRESS NOTES
Maurice Katherineton [facility], [address] [place] [address]   Office [phone], Fax [phone]      Chief Complaint:     Chief Complaint   Patient presents with    Annual Exam     Patient is here for AWV, she is followed by a GYN. Subjective:   HPI:  Dipika Rapp is a 28 y.o. female that presents for the above complaint. Saw OB/GYN 1 week ago. Routine care. Per [facility] last pap   Neurologist: seen for migraine. Appt in 2 days. Anxiety: on lexapro 20 mg QD. No complaints. Review of Systems   All other systems reviewed and are negative. Health Maintenance:  Health Maintenance Due   Topic Date Due    HIV screen  Never done    Hepatitis C screen  Never done        Current Medications  Current medications include:   Current Outpatient Medications   Medication Sig    escitalopram (LEXAPRO) 20 MG tablet Take 1 tablet by mouth daily    ibuprofen (ADVIL;MOTRIN) 800 MG tablet Take 1 tablet by mouth every 6 hours as needed    medroxyPROGESTERone (DEPO-PROVERA) 150 MG/ML injection Inject 150 mg into the muscle    propranolol (INDERAL LA) 60 MG extended release capsule Take 1 capsule by mouth daily    topiramate ER (TROKENDI XR) 50 MG CP24 Take 1 capsule by mouth daily     No current facility-administered medications for this visit.        Allergies  Allergies   Allergen Reactions    Glycerin Rash    Isomalt Rash and Swelling    [place] Oil Rash     RASH WITH TOUCH OF CORN: ABLE TO CONSUME  RASH WITH TOUCH OF CORN: ABLE TO CONSUME       Past Medical History  Past Medical History:   Diagnosis Date    Anxiety 9/2022    Bell's palsy     Depression 9/2022    Migraine     Neurological disorder        Past Surgical History   Past Surgical History:   Procedure Laterality Date    OTHER SURGICAL HISTORY      removal of oral cyst       Family History  Family History   Problem Relation Age of Onset    Depression Maternal Grandmother     Heart Disease Maternal Grandmother     Diabetes Maternal Grandmother     Allergy

## 2023-09-05 NOTE — PROGRESS NOTES
Germán Khan is a 28 y.o. female    Chief Complaint   Patient presents with    Annual Exam     Patient is here for AWV, she is followed by a GYN. 1. Have you been to the ER, urgent care clinic since your last visit? Hospitalized since your last visit?no    2. Have you seen or consulted any other health care providers outside of the [address] [place] [address] since your last visit? Include any pap smears or colon screening. no    There were no vitals filed for this visit. No flowsheet data found.   Health Maintenance Due   Topic Date Due    HIV screen  Never done    Hepatitis C screen  Never done    Pap smear  Never done    COVID-19 Vaccine (4 - Booster for Pfizer series) 12/04/2021    Flu vaccine (1) 08/01/2023

## 2023-09-06 LAB
CHOLEST SERPL-MCNC: 154 MG/DL (ref 100–199)
HCV IGG SERPL QL IA: NON REACTIVE
HCV IGG SERPL QL IA: NON REACTIVE
HDLC SERPL-MCNC: 37 MG/DL
HIV 1+2 AB+HIV1 P24 AG SERPL QL IA: NON REACTIVE
HIV 1+2 AB+HIV1 P24 AG SERPL QL IA: NON REACTIVE
IMP & REVIEW OF LAB RESULTS: NORMAL
LDLC SERPL CALC-MCNC: 82 MG/DL (ref 0–99)
TRIGL SERPL-MCNC: 206 MG/DL (ref 0–149)
VLDLC SERPL CALC-MCNC: 35 MG/DL (ref 5–40)

## 2023-09-07 ENCOUNTER — OFFICE VISIT (OUTPATIENT)
Age: 28
End: 2023-09-07
Payer: COMMERCIAL

## 2023-09-07 VITALS
WEIGHT: 225 LBS | OXYGEN SATURATION: 98 % | HEIGHT: 66 IN | RESPIRATION RATE: 16 BRPM | HEART RATE: 76 BPM | DIASTOLIC BLOOD PRESSURE: 79 MMHG | BODY MASS INDEX: 36.16 KG/M2 | SYSTOLIC BLOOD PRESSURE: 122 MMHG

## 2023-09-07 DIAGNOSIS — G43.709 CHRONIC MIGRAINE WITHOUT AURA, NOT INTRACTABLE, WITHOUT STATUS MIGRAINOSUS: Primary | ICD-10-CM

## 2023-09-07 DIAGNOSIS — F90.9 ATTENTION DEFICIT HYPERACTIVITY DISORDER (ADHD), UNSPECIFIED ADHD TYPE: ICD-10-CM

## 2023-09-07 PROCEDURE — 99214 OFFICE O/P EST MOD 30 MIN: CPT

## 2023-09-07 RX ORDER — PROPRANOLOL HCL 60 MG
60 CAPSULE, EXTENDED RELEASE 24HR ORAL DAILY
Qty: 90 CAPSULE | Refills: 1 | Status: SHIPPED | OUTPATIENT
Start: 2023-09-07

## 2023-09-07 RX ORDER — TOPIRAMATE 50 MG/1
1 CAPSULE, EXTENDED RELEASE ORAL DAILY
Qty: 90 CAPSULE | Refills: 1 | Status: SHIPPED | OUTPATIENT
Start: 2023-09-07

## 2023-09-07 ASSESSMENT — ENCOUNTER SYMPTOMS: PHOTOPHOBIA: 0

## 2023-09-07 ASSESSMENT — PATIENT HEALTH QUESTIONNAIRE - PHQ9
SUM OF ALL RESPONSES TO PHQ QUESTIONS 1-9: 0
2. FEELING DOWN, DEPRESSED OR HOPELESS: 0
SUM OF ALL RESPONSES TO PHQ QUESTIONS 1-9: 0
1. LITTLE INTEREST OR PLEASURE IN DOING THINGS: 0
SUM OF ALL RESPONSES TO PHQ9 QUESTIONS 1 & 2: 0
SUM OF ALL RESPONSES TO PHQ QUESTIONS 1-9: 0
SUM OF ALL RESPONSES TO PHQ QUESTIONS 1-9: 0

## 2023-09-07 NOTE — PROGRESS NOTES
Chief Complaint   Patient presents with    Follow-up    Migraine      Vitals:    09/07/23 1027   BP: 122/79   Pulse: 76   Resp: 16   SpO2: 98%

## 2023-09-07 NOTE — PROGRESS NOTES
Chief Complaint   Patient presents with    Follow-up    Migraine       HPI    Mrs Molly Cruz is a 28year old female here for follow up. She is a new patient for me. She was last seen by Dr Renetta Aponte on 7/15/22 for migraines and ADHD. She was on Topamax but started having some side effects. She was switched to Trokindi 50  mg at bedtime at last visit to help with the side effects. She is also on Propanolol 60 mg daily. She is reporting good control with her migraines. She reports about 1 per month. She uses OTC Excedrin and it works well. She attributes it to stress. Side effects are much better. Some tinging in her feet every now and then lasting a few seconds then goes away. Denies dizziness or fatigue. Avtar Meds: Propanolol ER, Topamax    BACKGROUND  Nathalie Quinones came in for follow-up. She was last seen almost 10 months ago. Overall, she is doing quite well and now may get a migraine 1 to 2 days out of the month. Uses ibuprofen which usually works for  [email or web address] to take propanolol extended release 60 mg daily along with topiramate 50 mg twice daily. She does report feeling very fatigued during the day and feels that she has to take a nap. Occasionally she will feel some tingling sensation in her fingertips and mouth. She has noticed that if she misses the dose of propanolol she gets a migraine but not so much association with missed dose of topiramate. She had lost some weight but recently put it back on. She is planning to go to grad school in the near future and then eventually wants to adopt kids. About a week ago she had an episode of dizziness and it would exacerbate if she would get up too quickly or move. Also felt some palpitations with it. Symptoms subsided/resolved on their own after about 3 days. Cardiac work-up/EKG was negative. Did not have any focal motor or sensory deficits, changes in vision, speech or coordination.        Review of Systems   Eyes:  Negative for photophobia and

## 2023-09-13 ENCOUNTER — TELEPHONE (OUTPATIENT)
Age: 28
End: 2023-09-13

## 2023-09-13 NOTE — TELEPHONE ENCOUNTER
[address] would like to confirm if switch to generic was intentional for medication TOPIRAMATE    Please contact [phone]

## 2023-09-15 ENCOUNTER — TELEPHONE (OUTPATIENT)
Age: 28
End: 2023-09-15

## 2023-09-15 NOTE — TELEPHONE ENCOUNTER
Call placed to [address] [place] [address].  patient identifiers given. Confirmed topiramate  order.

## 2023-10-15 DIAGNOSIS — G43.709 CHRONIC MIGRAINE WITHOUT AURA, NOT INTRACTABLE, WITHOUT STATUS MIGRAINOSUS: ICD-10-CM

## 2023-10-16 RX ORDER — TOPIRAMATE 50 MG/1
1 CAPSULE, EXTENDED RELEASE ORAL DAILY
Qty: 30 CAPSULE | Refills: 3 | Status: SHIPPED | OUTPATIENT
Start: 2023-10-16

## 2024-01-22 ENCOUNTER — TELEPHONE (OUTPATIENT)
Age: 29
End: 2024-01-22

## 2024-01-22 DIAGNOSIS — G43.709 CHRONIC MIGRAINE WITHOUT AURA, NOT INTRACTABLE, WITHOUT STATUS MIGRAINOSUS: Primary | ICD-10-CM

## 2024-01-22 NOTE — TELEPHONE ENCOUNTER
Patient stating insurance no longer fills Trokendi.    Pls suggest a substitute and call patient      [phone]

## 2024-01-23 NOTE — TELEPHONE ENCOUNTER
Called patient. She stated that she has never tried the regular topiramate. Will give it a try. State to send to [facility].

## 2024-01-24 ENCOUNTER — TELEPHONE (OUTPATIENT)
Age: 29
End: 2024-01-24

## 2024-01-24 RX ORDER — TOPIRAMATE 50 MG/1
50 TABLET, FILM COATED ORAL 2 TIMES DAILY
Qty: 180 TABLET | Refills: 1 | Status: SHIPPED | OUTPATIENT
Start: 2024-01-24

## 2024-01-24 NOTE — TELEPHONE ENCOUNTER
Called patient. Informed her that the trokendi was extended release-last longer in the body so the provider sent in BID for the non extended release form. Patient verbalizes understanding.

## 2024-01-24 NOTE — TELEPHONE ENCOUNTER
Called patient. No answer. Left a VM stating the provider sent in topiramate 50mg BID to her [facility]. Stated if she has any questions or concerns to call back.

## 2024-01-24 NOTE — TELEPHONE ENCOUNTER
Patient needs clarification on dosage instructions for topiramate (TOPAMAX) 50 MG tablet . States she was previously taking 1 tablet 1x/day. Please contact

## 2024-02-22 ENCOUNTER — TELEPHONE (OUTPATIENT)
Age: 29
End: 2024-02-22

## 2024-02-22 NOTE — TELEPHONE ENCOUNTER
Patient called stating that she needed an updated [employer] letter for her housing. She wanted to know if the letter could be emailed to her due to her not being able to access her mychart if not I will inform the patient that the letter needs to be picked up. Correct email is in the chart.    Thanks!

## 2024-02-24 ENCOUNTER — PATIENT MESSAGE (OUTPATIENT)
Age: 29
End: 2024-02-24

## 2024-02-26 NOTE — TELEPHONE ENCOUNTER
Pt called to check the status of this letter. She stated that the deadline is this Thursday, 2/29. She is requesting that this is addressed asap, in order for her support dog in her home.    Dr. Noland, are you able to assist, due to Dr. Walker not returning until Thursday.?

## 2024-04-01 NOTE — PROGRESS NOTES
Anh Verde is a 31 y.o. female    Chief Complaint   Patient presents with    Medication Evaluation    Form Completion         1. Have you been to the ER, urgent care clinic since your last visit? Hospitalized since your last visit? No  2. Have you seen or consulted any other health care providers outside of the [address] [place] [address] since your last visit? Include any pap smears or colon screening.  No    Visit Vitals  /72 (BP 1 Location: Right arm, BP Patient Position: Sitting)   Pulse 86   Resp 16   Ht 5' 6\" (1.676 m)   Wt 225 lb (102.1 kg)   SpO2 97%   BMI 36.32 kg/m²     3 most recent PHQ Screens 7/15/2022   Little interest or pleasure in doing things Not at all   Feeling down, depressed, irritable, or hopeless Not at all   Total Score PHQ 2 0     Health Maintenance Due   Topic Date Due    Hepatitis C Screening  Never done    COVID-19 Vaccine (1) Never done    Pap Smear  Never done    Flu Vaccine (1) Never done normal mood with appropriate affect

## 2024-05-10 DIAGNOSIS — G43.709 CHRONIC MIGRAINE WITHOUT AURA, NOT INTRACTABLE, WITHOUT STATUS MIGRAINOSUS: ICD-10-CM

## 2024-05-10 RX ORDER — PROPRANOLOL HCL 60 MG
60 CAPSULE, EXTENDED RELEASE 24HR ORAL DAILY
Qty: 90 CAPSULE | Refills: 0 | Status: SHIPPED | OUTPATIENT
Start: 2024-05-10

## 2024-09-09 DIAGNOSIS — G43.709 CHRONIC MIGRAINE WITHOUT AURA, NOT INTRACTABLE, WITHOUT STATUS MIGRAINOSUS: ICD-10-CM

## 2024-09-12 RX ORDER — TOPIRAMATE 50 MG/1
50 TABLET, FILM COATED ORAL 2 TIMES DAILY
Qty: 180 TABLET | Refills: 0 | OUTPATIENT
Start: 2024-09-12